# Patient Record
Sex: FEMALE | Race: AMERICAN INDIAN OR ALASKA NATIVE
[De-identification: names, ages, dates, MRNs, and addresses within clinical notes are randomized per-mention and may not be internally consistent; named-entity substitution may affect disease eponyms.]

---

## 2019-12-12 ENCOUNTER — HOSPITAL ENCOUNTER (EMERGENCY)
Dept: HOSPITAL 5 - ED | Age: 39
Discharge: HOME | End: 2019-12-12
Payer: SELF-PAY

## 2019-12-12 VITALS — DIASTOLIC BLOOD PRESSURE: 94 MMHG | SYSTOLIC BLOOD PRESSURE: 194 MMHG

## 2019-12-12 DIAGNOSIS — R22.42: ICD-10-CM

## 2019-12-12 DIAGNOSIS — Z98.890: ICD-10-CM

## 2019-12-12 DIAGNOSIS — M25.562: Primary | ICD-10-CM

## 2019-12-12 DIAGNOSIS — I10: ICD-10-CM

## 2019-12-12 NOTE — EVENT NOTE
ED Screening Note


ED Screening Note: 


40 y/o female comes in for left knee pain that is non traumatic.  Denies any 

falls or injuries.  Started Sunday. No oral birth control an recent travels.  No

SOB or chest pain. 





This initial assessment/diagnostic orders/clinical plan/treatment(s) is/are 

subject to change based on patients health status, clinical progression and re-

assessment by fellow clinical providers in the ED. Further treatment and workup 

at subsequent clinical providers discretion. Patient/guardian urged not to elope

from the ED as their condition may be serious if not clinically assessed and 

managed. 





Initial orders include:

## 2019-12-12 NOTE — EMERGENCY DEPARTMENT REPORT
ED Extremity Problem HPI





- General


Chief complaint: Extremity Injury, Lower


Stated complaint: L KNEE/ANKLE PAIN


Time Seen by Provider: 12/12/19 10:59


Source: patient


Mode of arrival: Ambulatory


Limitations: Physical Limitation





- History of Present Illness


Initial comments: 





This is a 39-year-old female who presents to ED complaining of left knee pain 

and swelling that started Sunday afternoon.  Patient states she did not 

experience any fall injury or trauma to the knee.  Patient states pain is 

elicited anterior aspect of the knee.  Patient denies any shortness activities. 

Patient states that pain is worsened with a weight parent's.  Patient denies any

radiation elsewhere.


MD Complaint: extremity pain


Location: left


-: Yes arthralgia


Radiation: none


Severity scale (0 -10): 5


Quality: aching


Worsens with: weight bearing





- Related Data


                                  Previous Rx's











 Medication  Instructions  Recorded  Last Taken  Type


 


Cyclobenzaprine [Flexeril] 10 mg PO QHS PRN #20 tablet 12/12/19 Unknown Rx


 


Naproxen [Naprosyn] 500 mg PO BID #30 tablet 12/12/19 Unknown Rx











                                    Allergies











Allergy/AdvReac Type Severity Reaction Status Date / Time


 


No Known Allergies Allergy   Unverified 12/12/19 10:51














ED Review of Systems


ROS: 


Stated complaint: L KNEE/ANKLE PAIN


Other details as noted in HPI





Comment: All other systems reviewed and negative





ED Past Medical Hx





- Past Medical History


Previous Medical History?: No


Hx Hypertension: Yes





- Surgical History


Additional Surgical History: C SECTION





- Social History


Smoking Status: Never Smoker


Substance Use Type: Alcohol





- Medications


Home Medications: 


                                Home Medications











 Medication  Instructions  Recorded  Confirmed  Last Taken  Type


 


Cyclobenzaprine [Flexeril] 10 mg PO QHS PRN #20 tablet 12/12/19  Unknown Rx


 


Naproxen [Naprosyn] 500 mg PO BID #30 tablet 12/12/19  Unknown Rx














ED Physical Exam





- General


Limitations: Physical Limitation


General appearance: alert, in no apparent distress





- Head


Head exam: Present: atraumatic, normocephalic





- Eye


Eye exam: Present: normal appearance





- ENT


ENT exam: Present: mucous membranes moist





- Neck


Neck exam: Present: normal inspection





- Respiratory


Respiratory exam: Present: normal lung sounds bilaterally.  Absent: respiratory 

distress





- Cardiovascular


Cardiovascular Exam: Present: regular rate, normal rhythm.  Absent: systolic 

murmur, diastolic murmur, rubs, gallop





- GI/Abdominal


GI/Abdominal exam: Present: soft, normal bowel sounds





- Extremities Exam


Extremities exam: Present: normal inspection, full ROM, tenderness (anterior 

aspect of knee left).  Absent: pedal edema, joint swelling, calf tenderness





- Back Exam


Back exam: Present: normal inspection, full ROM.  Absent: tenderness, CVA 

tenderness (R), CVA tenderness (L)





- Neurological Exam


Neurological exam: Present: alert, oriented X3, CN II-XII intact, normal gait





- Psychiatric


Psychiatric exam: Present: normal affect, normal mood





- Skin


Skin exam: Present: warm, dry, intact, normal color.  Absent: rash





ED Course


                                   Vital Signs











  12/12/19





  10:53


 


Temperature 98.0 F


 


Pulse Rate 105 H


 


Respiratory 16





Rate 


 


Blood Pressure 194/94


 


O2 Sat by Pulse 100





Oximetry 














ED Medical Decision Making





- Radiology Data


Radiology results: report reviewed, image reviewed








DUPLEX DOPPLER LOWER EXTREMITY VEINS, LEFT





INDICATION:


left leg pain and swelling..





TECHNIQUE:


Duplex doppler imaging was performed through the veins of the left lower 

extremity using venous


compression and other maneuvers.





COMPARISON:


None available.





FINDINGS:


Common femoral vein: Negative.


Superficial femoral vein: Negative.


Popliteal vein: Negative.


Calf veins: Negative.





Additional findings: Left knee effusion.





IMPRESSION:


1. Negative for DVT.


2. Left knee effusion.





Signer Name: Dionte Peters MD


Signed: 12/12/2019 11:52 AM


Workstation Name: [id number]








Transcribed By: ROS


Dictated By: Dionte Peters MD


Electronically Authenticated By: Dionte Peters MD


Signed Date/Time: 12/12/19 1152








- Medical Decision Making





39-year-old female presents to ED with myalgiaarthralgia of the left knee


ED course: There was no swelling noted upon examination, exam shows mild ten

derness to palpation, Homans test negative.


Ultrasound Doppler studies was performed.  Results above


Vital signs are normal patient is in no acute distress


Discussed with patient follow-up with primary care physician.  


Discussed the patient and take medications as prescribed. 


Patient has no neurological deficit.  Patient is alert and oriented 3  and 

understands all instructions given.


 Discussed  drowsiness effect of Flexeril makes her drowsy and not to operate 

machinery while taking flexeril


Critical care attestation.: 


If time is entered above; I have spent that time in minutes in the direct care 

of this critically ill patient, excluding procedure time.








ED Disposition


Clinical Impression: 


 Left anterior knee pain





Disposition: DC-01 TO HOME OR SELFCARE


Is pt being admited?: No


Does the pt Need Aspirin: No


Condition: Stable


Instructions:  Osteoarthritis (ED), Knee Pain (ED), Arthralgia (ED)


Additional Instructions: 


Make sure to follow up with the primary care physician as discussed.


Take all your medications as you've been prescribed.


If you have any worsening symptoms or develop new symptoms please return to ED 

immediately.


Prescriptions: 


Cyclobenzaprine [Flexeril] 10 mg PO QHS PRN #20 tablet


 PRN Reason: Muscle Spasm


Naproxen [Naprosyn] 500 mg PO BID #30 tablet


Referrals: 


PRIMARY CARE,MD [Primary Care Provider] - 3-5 Days


[facility] [Provider Group] - 3-5 Days


Forms:  Work/School Release Form(ED)


Time of Disposition: 13:47